# Patient Record
Sex: FEMALE | Race: OTHER | HISPANIC OR LATINO
[De-identification: names, ages, dates, MRNs, and addresses within clinical notes are randomized per-mention and may not be internally consistent; named-entity substitution may affect disease eponyms.]

---

## 2018-06-08 ENCOUNTER — APPOINTMENT (OUTPATIENT)
Dept: ORTHOPEDIC SURGERY | Facility: CLINIC | Age: 68
End: 2018-06-08

## 2018-07-09 ENCOUNTER — APPOINTMENT (OUTPATIENT)
Dept: ORTHOPEDIC SURGERY | Facility: CLINIC | Age: 68
End: 2018-07-09
Payer: COMMERCIAL

## 2018-07-09 VITALS
HEIGHT: 64 IN | WEIGHT: 278 LBS | DIASTOLIC BLOOD PRESSURE: 78 MMHG | BODY MASS INDEX: 47.46 KG/M2 | SYSTOLIC BLOOD PRESSURE: 120 MMHG

## 2018-07-09 PROCEDURE — 99204 OFFICE O/P NEW MOD 45 MIN: CPT | Mod: 25

## 2018-07-09 PROCEDURE — 20610 DRAIN/INJ JOINT/BURSA W/O US: CPT | Mod: LT

## 2018-07-09 PROCEDURE — 73564 X-RAY EXAM KNEE 4 OR MORE: CPT | Mod: RT

## 2018-07-09 RX ORDER — TRIAMCINOLONE ACETONIDE 1 MG/G
0.1 CREAM TOPICAL
Qty: 454 | Refills: 0 | Status: ACTIVE | COMMUNITY
Start: 2018-02-08

## 2018-07-09 RX ORDER — MELOXICAM 15 MG/1
15 TABLET ORAL DAILY
Qty: 30 | Refills: 2 | Status: ACTIVE | COMMUNITY
Start: 2018-07-09 | End: 1900-01-01

## 2018-07-09 RX ORDER — FLUOCINONIDE 0.5 MG/G
0.05 CREAM TOPICAL
Qty: 120 | Refills: 0 | Status: ACTIVE | COMMUNITY
Start: 2018-02-21

## 2018-07-09 RX ORDER — CELECOXIB 200 MG/1
200 CAPSULE ORAL
Qty: 30 | Refills: 0 | Status: ACTIVE | COMMUNITY
Start: 2018-05-23

## 2018-07-09 RX ORDER — PROMETHAZINE HYDROCHLORIDE 6.25 MG/5ML
6.25 SOLUTION ORAL
Qty: 200 | Refills: 0 | Status: ACTIVE | COMMUNITY
Start: 2018-04-11

## 2018-07-09 RX ORDER — AMOXICILLIN 875 MG/1
875 TABLET, FILM COATED ORAL
Qty: 14 | Refills: 0 | Status: ACTIVE | COMMUNITY
Start: 2018-04-11

## 2020-04-20 ENCOUNTER — APPOINTMENT (OUTPATIENT)
Dept: ORTHOPEDIC SURGERY | Facility: CLINIC | Age: 70
End: 2020-04-20

## 2020-06-15 ENCOUNTER — APPOINTMENT (OUTPATIENT)
Dept: ORTHOPEDIC SURGERY | Facility: CLINIC | Age: 70
End: 2020-06-15
Payer: COMMERCIAL

## 2020-06-15 VITALS — HEIGHT: 64 IN | BODY MASS INDEX: 47.12 KG/M2 | WEIGHT: 276 LBS

## 2020-06-15 PROCEDURE — 99214 OFFICE O/P EST MOD 30 MIN: CPT

## 2020-06-15 PROCEDURE — 73564 X-RAY EXAM KNEE 4 OR MORE: CPT | Mod: RT

## 2020-06-15 RX ORDER — CELECOXIB 200 MG/1
200 CAPSULE ORAL
Qty: 60 | Refills: 0 | Status: ACTIVE | COMMUNITY
Start: 2020-06-15 | End: 1900-01-01

## 2020-06-15 NOTE — PHYSICAL EXAM
[de-identified] : Patient is well nourished, well-developed, in no acute distress, with appropriate mood and affect. The patient is oriented to time, place, and person. Respirations are even and unlabored. Gait evaluation does reveal a limp. There is no inguinal adenopathy. Bilateral limbs are well-perfused, without skin lesions, shows a grossly normal motor and sensory examination. The right knee motion is significantly reduced and does cause significant pain. The right knee moves from 0 to 125 degrees. The knee is stable within that range-of-motion to AP and ML stress. The alignment of the knee is 5 degrees varus. Muscle strength is normal. Pedal pulses are palpable. Hip examination was negative. The left knee motion is significantly reduced and does cause significant pain. The left knee moves from 0 to 125 degrees. The knee is stable within that range-of-motion to AP and ML stress. The alignment of the knee is 5 degrees varus. Muscle strength is normal. Pedal pulses are palpable. Hip examination was negative. [de-identified] : Long standing knee, AP knee, lateral knee, and patellar views of the bilateral knee were ordered and taken in the office and demonstrate degenerative joint disease of the knee with joint space narrowing, osteophyte formation, and subchondral sclerosis.

## 2020-06-15 NOTE — HISTORY OF PRESENT ILLNESS
[de-identified] : Patient is a very nice 69 year old female who presents for reevaluation of left more than right knee pain, which is severe in intensity. patient was last seen in May 2018 diagnosed with bilateral OA and had injection to knees.  Patient than followed up with [facility], had several cortisone injections, uses tyelnol and OTC NSAID's with some relief. She has gone to PT with no relief.  Walking tolerance is reduced. Patient uses cane for ambulation The pain substantially limits of activities of daily living. patient states no radiation of pain, numbness to feet or weakness. Patient was scheduled for Left TKA next week at [facility] however surgeon cancelled stating she needs to lose weight, patient here for second opinion. patient does have right knee pain, but mild currently

## 2020-06-15 NOTE — DISCUSSION/SUMMARY
[de-identified] : This patient has bilateral knee osteoarthritis.  The patient is not an appropriate candidate for surgical intervention at this time because her BMI is 47 which is over the cutoff of 40 because of the significant increase in risk of complications of proceeding with surgery over this cut off.  This is similar to what [facility] has told her.  An extensive discussion was conducted on the natural history of the disease and the variety of surgical and non-surgical options available to the patient including, but not limited to non-steroidal anti-inflammatory medications, steroid injections, physical therapy, maintenance of ideal body weight, and reduction of activity.  Advised the patient to aggressively work on weight loss.  Offered a nutrition referral.  Celebrex was prescribed.  The patient will schedule an appointment as needed.\par

## 2020-06-16 RX ORDER — CELECOXIB 200 MG/1
200 CAPSULE ORAL
Qty: 60 | Refills: 0 | Status: ACTIVE | COMMUNITY
Start: 2020-06-16 | End: 1900-01-01

## 2020-09-14 ENCOUNTER — APPOINTMENT (OUTPATIENT)
Dept: ORTHOPEDIC SURGERY | Facility: CLINIC | Age: 70
End: 2020-09-14

## 2020-09-21 ENCOUNTER — APPOINTMENT (OUTPATIENT)
Dept: ORTHOPEDIC SURGERY | Facility: CLINIC | Age: 70
End: 2020-09-21
Payer: COMMERCIAL

## 2020-09-21 VITALS — BODY MASS INDEX: 45.41 KG/M2 | WEIGHT: 266 LBS | HEIGHT: 64 IN

## 2020-09-21 PROCEDURE — 99213 OFFICE O/P EST LOW 20 MIN: CPT

## 2020-09-21 RX ORDER — CLOTRIMAZOLE AND BETAMETHASONE DIPROPIONATE 10; .5 MG/G; MG/G
1-0.05 CREAM TOPICAL
Qty: 45 | Refills: 0 | Status: ACTIVE | COMMUNITY
Start: 2020-06-02

## 2020-09-21 RX ORDER — NAPROXEN 500 MG/1
500 TABLET ORAL
Qty: 14 | Refills: 0 | Status: ACTIVE | COMMUNITY
Start: 2020-08-26

## 2020-09-21 NOTE — DISCUSSION/SUMMARY
[de-identified] : This patient has bilateral knee osteoarthritis.  The patient is not an appropriate candidate for surgical intervention at this time because her BMI is 45.  She is to continue to work towards her BMI goal of 40 to be a candidate for surgery.  I told her that if she loses approximately 15-20 more pounds we will proceed with total knee arthroplasty.  Advised the patient to aggressively work on weight loss.  Offered a nutrition referral.  Continue with Celebrex.  The patient will schedule an appointment as needed.\par

## 2020-09-21 NOTE — PHYSICAL EXAM
[de-identified] : Patient is well nourished, well-developed, in no acute distress, with appropriate mood and affect. The patient is oriented to time, place, and person. Respirations are even and unlabored. Gait evaluation does reveal a limp. There is no inguinal adenopathy. Bilateral limbs are well-perfused, without skin lesions, shows a grossly normal motor and sensory examination. The right knee motion is significantly reduced and does cause significant pain. The right knee moves from 0 to 125 degrees. The knee is stable within that range-of-motion to AP and ML stress. The alignment of the knee is 5 degrees varus. Muscle strength is normal. Pedal pulses are palpable. Hip examination was negative. The left knee motion is significantly reduced and does cause significant pain. The left knee moves from 0 to 125 degrees. The knee is stable within that range-of-motion to AP and ML stress. The alignment of the knee is 5 degrees varus. Muscle strength is normal. Pedal pulses are palpable. Hip examination was negative.

## 2020-11-02 ENCOUNTER — APPOINTMENT (OUTPATIENT)
Dept: ORTHOPEDIC SURGERY | Facility: CLINIC | Age: 70
End: 2020-11-02
Payer: COMMERCIAL

## 2020-11-02 VITALS — WEIGHT: 254 LBS | HEIGHT: 64 IN | BODY MASS INDEX: 43.36 KG/M2

## 2020-11-02 PROCEDURE — 99072 ADDL SUPL MATRL&STAF TM PHE: CPT

## 2020-11-02 PROCEDURE — 99214 OFFICE O/P EST MOD 30 MIN: CPT

## 2020-11-02 PROCEDURE — 73564 X-RAY EXAM KNEE 4 OR MORE: CPT | Mod: RT

## 2020-11-02 NOTE — DISCUSSION/SUMMARY
[de-identified] : This patient has bilateral knee osteoarthritis.  She is failed a course of conservative management and would like to proceed with left total knee arthroplasty.  We will utilize robotic navigation for assistance given her elevated BMI.  After allowing a minimum of 3 months for healing we should monitor proceed with a staged right total knee arthroplasty.\par \par The patient is an appropriate candidate for consideration of left total knee replacement. An extensive discussion was conducted of the natural history of the disease and the variety of surgical and non-surgical treatment options available to the patient. A risk/benefit analysis was discussed with the patient reviewing the advantages and disadvantages of surgical intervention at this time. Both the level and length of the patient's pain have made additional conservative treatment measures consisting of NSAIDs, physical therapy, corticosteroids, and/or viscosupplementation contraindicated. A full explanation was given of the nature and the purpose of the procedure and anesthesia, its benefits, possible alternative methods of diagnosis or treatment, the risks involved, the possibility of complications, the foreseeable consequences of the procedure and the possible results of the non-treatment. I reviewed the plan of care as well as used a model of a total knee implant equivalent to the one that will be used for their total knee joint replacement. The ability to secure the implant utilizing cement or cementless (press-fit) was discussed with the patient. The patient agrees with the plan of care, as well as the use of implants for their total knee replacement.   We also discussed that if robotic/computer navigation is utilized, then additional incisions may need to be made to accommodate the computer navigation arrays, which will be placed in the femur and tibia.\par \par No guarantee or assurance was made as to the results that may be obtained. Specifically, the risks were identified to include, but are not limited to the following: Infection, phlebitis, pulmonary embolism, death, paralysis, dislocation, pain, stiffness, instability, limp, weakness, breakage, leg-length inequality, uncontrolled bleeding, nerve injury, blood vessel injury, pressure sores, anesthetic risks, delayed healing of wound and bone, and wear and loosening. Further discussion was undertaken with the patient about the details of surgical preparation, treatment, and postoperative rehabilitation including medical clearance, autotransfusion, the hospital course, and the postoperative rehabilitation involved. All in all, I feel that this patient is a good candidate for surgical reconstruction.\par \par The patient and I discussed the current SARS-CoV-2 (COVID-19) pandemic which has affected our local hospitals. We discussed that our hospitals treat patients with COVID-19. All efforts will be made to avoid cohorting the patient with diagnosed or suspected COVID-19 patient. They also understand that we will screen them 24-48 hours prior to surgery. Despite our best efforts, there is a potential risk for iatrogenic transmission of COVID-19 to the patient during the perioperative period. Jesusita COVID-19 during the perioperative period may increase the patient´s risks of an adverse outcome including postoperative pneumonia, difficulty breathing, requirement for a breathing tube (general endotracheal intubation), and death. The patient is understanding of this risk, and is willing to proceed with surgery at this time.\par \par The patient has been counseled regarding the elevated risks associated with surgical complications in patients with a BMI>35. The patient demonstrates a profound understanding of the increased risk. Nutrition and bariatric surgery options were discussed with the patient. After a lengthy discussion, the patient agreed to make a coordinated effort at weight loss prior to surgical intervention. The patient understands our BMI policy and they will attempt to bring their BMI below 40 and they are agreeable to this but she is wanting to proceed with surgery at this time recognizing the significantly increased risks.

## 2020-11-02 NOTE — PHYSICAL EXAM
[de-identified] : Patient is well nourished, well-developed, in no acute distress, with appropriate mood and affect. The patient is oriented to time, place, and person. Respirations are even and unlabored. Gait evaluation does reveal a limp. There is no inguinal adenopathy. Bilateral limbs are well-perfused, without skin lesions, shows a grossly normal motor and sensory examination. The right knee motion is significantly reduced and does cause significant pain. The right knee moves from 0 to 125 degrees. The knee is stable within that range-of-motion to AP and ML stress. The alignment of the knee is 5 degrees varus. Muscle strength is normal. Pedal pulses are palpable. Hip examination was negative. The left knee motion is significantly reduced and does cause significant pain. The left knee moves from 0 to 125 degrees. The knee is stable within that range-of-motion to AP and ML stress. The alignment of the knee is 5 degrees varus. Muscle strength is normal. Pedal pulses are palpable. Hip examination was negative. [de-identified] : Long standing knee, AP knee, lateral knee, and patellar views of the bilateral knee were ordered and taken in the office and demonstrate degenerative joint disease of the knee with joint space narrowing, osteophyte formation, and subchondral sclerosis.

## 2020-11-02 NOTE — HISTORY OF PRESENT ILLNESS
[de-identified] : Patient is a very nice 70 year old female who presents for reevaluation of left more than right knee pain, which is severe in intensity.  I have previously diagnosed with severe bilateral knee osteoarthritis.  She is been working on weight loss and has lost approximately 70 pounds recently. She is ready felt tried and failed a course of conservative management including some runs of cortisone injections, Tylenol, NSAIDs.  She also has had several rounds of cortisone injections.  She has tried 1 3 months of physical therapy without relief.  Walking tolerance is limited.  Activities daily living are limited.  Using a cane for ambulation.  The patient denies any radiation of the pain to the feet and it is not associated with numbness, tingling, or weakness.

## 2020-12-02 ENCOUNTER — OUTPATIENT (OUTPATIENT)
Dept: OUTPATIENT SERVICES | Facility: HOSPITAL | Age: 70
LOS: 1 days | Discharge: HOME | End: 2020-12-02
Payer: COMMERCIAL

## 2020-12-02 VITALS
SYSTOLIC BLOOD PRESSURE: 130 MMHG | HEART RATE: 82 BPM | HEIGHT: 66 IN | DIASTOLIC BLOOD PRESSURE: 72 MMHG | OXYGEN SATURATION: 97 % | WEIGHT: 245.82 LBS | TEMPERATURE: 98 F

## 2020-12-02 DIAGNOSIS — Z01.818 ENCOUNTER FOR OTHER PREPROCEDURAL EXAMINATION: ICD-10-CM

## 2020-12-02 DIAGNOSIS — M17.12 UNILATERAL PRIMARY OSTEOARTHRITIS, LEFT KNEE: ICD-10-CM

## 2020-12-02 DIAGNOSIS — Z90.49 ACQUIRED ABSENCE OF OTHER SPECIFIED PARTS OF DIGESTIVE TRACT: Chronic | ICD-10-CM

## 2020-12-02 LAB
A1C WITH ESTIMATED AVERAGE GLUCOSE RESULT: 5.1 % — SIGNIFICANT CHANGE UP (ref 4–5.6)
ANION GAP SERPL CALC-SCNC: 3 MMOL/L — LOW (ref 5–17)
APTT BLD: 26.6 SEC — LOW (ref 27.5–35.5)
BLD GP AB SCN SERPL QL: SIGNIFICANT CHANGE UP
BUN SERPL-MCNC: 8 MG/DL — SIGNIFICANT CHANGE UP (ref 7–23)
CALCIUM SERPL-MCNC: 10.4 MG/DL — HIGH (ref 8.5–10.1)
CHLORIDE SERPL-SCNC: 110 MMOL/L — HIGH (ref 96–108)
CO2 SERPL-SCNC: 30 MMOL/L — SIGNIFICANT CHANGE UP (ref 22–31)
CREAT SERPL-MCNC: 0.58 MG/DL — SIGNIFICANT CHANGE UP (ref 0.5–1.3)
ESTIMATED AVERAGE GLUCOSE: 100 MG/DL — SIGNIFICANT CHANGE UP (ref 68–114)
GLUCOSE SERPL-MCNC: 82 MG/DL — SIGNIFICANT CHANGE UP (ref 70–99)
HCT VFR BLD CALC: 41.4 % — SIGNIFICANT CHANGE UP (ref 34.5–45)
HGB BLD-MCNC: 13 G/DL — SIGNIFICANT CHANGE UP (ref 11.5–15.5)
INR BLD: 1 RATIO — SIGNIFICANT CHANGE UP (ref 0.88–1.16)
MCHC RBC-ENTMCNC: 28.2 PG — SIGNIFICANT CHANGE UP (ref 27–34)
MCHC RBC-ENTMCNC: 31.4 GM/DL — LOW (ref 32–36)
MCV RBC AUTO: 89.8 FL — SIGNIFICANT CHANGE UP (ref 80–100)
MRSA PCR RESULT.: SIGNIFICANT CHANGE UP
NRBC # BLD: 0 /100 WBCS — SIGNIFICANT CHANGE UP (ref 0–0)
PLATELET # BLD AUTO: 208 K/UL — SIGNIFICANT CHANGE UP (ref 150–400)
POTASSIUM SERPL-MCNC: 3.9 MMOL/L — SIGNIFICANT CHANGE UP (ref 3.5–5.3)
POTASSIUM SERPL-SCNC: 3.9 MMOL/L — SIGNIFICANT CHANGE UP (ref 3.5–5.3)
PROTHROM AB SERPL-ACNC: 11.6 SEC — SIGNIFICANT CHANGE UP (ref 10.6–13.6)
RBC # BLD: 4.61 M/UL — SIGNIFICANT CHANGE UP (ref 3.8–5.2)
RBC # FLD: 13.8 % — SIGNIFICANT CHANGE UP (ref 10.3–14.5)
S AUREUS DNA NOSE QL NAA+PROBE: SIGNIFICANT CHANGE UP
SODIUM SERPL-SCNC: 143 MMOL/L — SIGNIFICANT CHANGE UP (ref 135–145)
WBC # BLD: 3.21 K/UL — LOW (ref 3.8–10.5)
WBC # FLD AUTO: 3.21 K/UL — LOW (ref 3.8–10.5)

## 2020-12-02 PROCEDURE — 93010 ELECTROCARDIOGRAM REPORT: CPT

## 2020-12-02 NOTE — PHYSICAL THERAPY INITIAL EVALUATION ADULT - CRITERIA FOR SKILLED THERAPEUTIC INTERVENTIONS
anticipated discharge recommendation/functional limitations in following categories/risk reduction/prevention/impairments found/rehab potential/anticipated equipment needs at discharge/therapy frequency

## 2020-12-02 NOTE — PHYSICAL THERAPY INITIAL EVALUATION ADULT - IMPAIRMENTS FOUND, PT EVAL
joint integrity and mobility/aerobic capacity/endurance/ROM/gait, locomotion, and balance/muscle strength/posture

## 2020-12-02 NOTE — H&P PST ADULT - NSICDXPASTSURGICALHX_GEN_ALL_CORE_FT
Problem: Diabetes/Glucose Control  Goal: Glucose maintained within prescribed range  Description: INTERVENTIONS:  - Monitor Blood Glucose as ordered  - Assess for signs and symptoms of hyperglycemia and hypoglycemia  - Administer ordered medications to m Initiate emergency measures for life threatening arrhythmias  - Monitor electrolytes and administer replacement therapy as ordered  Outcome: Progressing   Patient with chronic pain. PRN percocet given. S/p cardiac cath 11/2, R groin site CDI.  Plan to disch PAST SURGICAL HISTORY:  S/P cholecystectomy

## 2020-12-02 NOTE — OCCUPATIONAL THERAPY INITIAL EVALUATION ADULT - ANTICIPATED DISCHARGE DISPOSITION, OT EVAL
Home with home OT for home safety evaluation and to improve functional ADLs and transfers/mobility. home w/ outpatient/Pending post op OT evaluation.

## 2020-12-02 NOTE — PHYSICAL THERAPY INITIAL EVALUATION ADULT - TINETTI BALANCE TEST, REHAB EVAL
Sitting Balance - 1/1 , Rises From Chair - 2/2, Attempts to rise - 2/2 , Immediate Standing Balance - 2/2,  Standing Balance - 2/2, Nudged -  0/2, Eyes Closed - 0/1,  Turning 360 Deg -  1/2, Sitting down - 2/2, Balance Score - 12/16

## 2020-12-02 NOTE — PHYSICAL THERAPY INITIAL EVALUATION ADULT - PERTINENT HX OF CURRENT PROBLEM, REHAB EVAL
Pt with chronic pain and instability with limited functional mobility in L Pt with chronic pain and instability with limited functional mobility in L knee. Pt is scheduled for elective total joint replacement on 12/18/20  by  Dr. James.

## 2020-12-02 NOTE — OCCUPATIONAL THERAPY INITIAL EVALUATION ADULT - ADDITIONAL COMMENTS
Patient lives with mother (Daughter will be able to assist post op) in an apartment with 6 steps to enter with a R rail. Once inside, the patient main bedroom and bathroom is on that floor when entering. The patients bathroom has a tub/shower combination, fixed shower head, comfort height toilet seat + grab bars. The patient reports that a 3/1 commode can fit over the toilet at home. The patient ambulates with no device inside of the home and uses a cane outside of the home. The patient does not own any other device for ambulation. The patient daily pain is a 8/10 at rest and a 8/10 with movement. The patient manages the pain with Tylenol and rest. The patient has no recent outpatient PT, no recent falls and no buckling. The patient wears glasses for reading, R handed, drives and has no hearing impairments.

## 2020-12-02 NOTE — PHYSICAL THERAPY INITIAL EVALUATION ADULT - TINETTI GAIT TEST, REHAB EVAL
Indication of gait - 1/1,   Step Length and height - 1/2,   Foot Clearance - 2/2,   Step Symmetry - 0/1,  Step Continuity - 1/1,   Path - 2/ 2,   Trunk - 1/2,   Walking Time - 0/1,   Total Score - 8/12

## 2020-12-02 NOTE — PHYSICAL THERAPY INITIAL EVALUATION ADULT - GENERAL OBSERVATIONS, REHAB EVAL
Patient was seen seated c straight cane in [facility] waiting area c no apparent distress. Height:  5'4"  Weight: 249 lbs

## 2020-12-02 NOTE — H&P PST ADULT - ASSESSMENT
Preoperative examination  Assessment and Plan: labs - CBC,BMP, PT/INR, aPTT, T & S, HgA1C, EKG, PT/ OT consult  Preop 3 day Hibiclens instruction reviewed and given  Instructed that if nose cx is positive, use mupuricin for 5 days and checklist given.   Patient instructed of NPO status and to take routine meds DOS with sips of water. Avoid NSAIDs, aspirin, and herbal products. Patient verbalized understanding.  Information on proper nutrition, increase protein and better food choices provided in packet.  Patient to go for COVID swabbing at [facility] 3 days prior to surgery.   CAPRINI SCORE [CLOT]    AGE RELATED RISK FACTORS                                                       MOBILITY RELATED FACTORS  [ ] Age 41-60 years                                            (1 Point)                  [ ] Bed rest                                                        (1 Point)  [ x] Age: 61-74 years                                           (2 Points)                 [ ] Plaster cast                                                   (2 Points)  [ ] Age= 75 years                                              (3 Points)                 [ ] Bed bound for more than 72 hours                 (2 Points)    DISEASE RELATED RISK FACTORS                                               GENDER SPECIFIC FACTORS  [ ] Edema in the lower extremities                       (1 Point)                  [ ] Pregnancy                                                     (1 Point)  [ ] Varicose veins                                               (1 Point)                  [ ] Post-partum < 6 weeks                                   (1 Point)             [ ] BMI > 25 Kg/m2                                            (1 Point)                  [ ] Hormonal therapy  or oral contraception          (1 Point)                 [ ] Sepsis (in the previous month)                        (1 Point)                  [ ] History of pregnancy complications                 (1 point)  [ ] Pneumonia or serious lung disease                                               [ ] Unexplained or recurrent                     (1 Point)           (in the previous month)                               (1 Point)  [ ] Abnormal pulmonary function test                     (1 Point)                 SURGERY RELATED RISK FACTORS  [ ] Acute myocardial infarction                              (1 Point)                 [ ]  Section                                             (1 Point)  [ ] Congestive heart failure (in the previous month)  (1 Point)               [ ] Minor surgery                                                  (1 Point)   [ ] Inflammatory bowel disease                             (1 Point)                 [ ] Arthroscopic surgery                                        (2 Points)  [ ] Central venous access                                      (2 Points)                [ ] General surgery lasting more than 45 minutes   (2 Points)       [ ] Stroke (in the previous month)                          (5 Points)               [ x] Elective arthroplasty                                         (5 Points)                                                                                                                                               HEMATOLOGY RELATED FACTORS                                                 TRAUMA RELATED RISK FACTORS  [ ] Prior episodes of VTE                                     (3 Points)                [ ] Fracture of the hip, pelvis, or leg                       (5 Points)  [ ] Positive family history for VTE                         (3 Points)                 [ ] Acute spinal cord injury (in the previous month)  (5 Points)  [ ] Prothrombin 66071 A                                     (3 Points)                 [ ] Paralysis  (less than 1 month)                             (5 Points)  [ ] Factor V Leiden                                             (3 Points)                  [ ] Multiple Trauma within 1 month                        (5 Points)  [ ] Lupus anticoagulants                                     (3 Points)                                                           [ ] Anticardiolipin antibodies                               (3 Points)                                                       [ ] High homocysteine in the blood                      (3 Points)                                             [ ] Other congenital or acquired thrombophilia      (3 Points)                                                [ ] Heparin induced thrombocytopenia                  (3 Points)                                          Total Score [   7   ]    Caprini Score 0 - 2:  Low Risk, No VTE Prophylaxis required for most patients, encourage ambulation  Caprini Score 3 - 6:  At Risk, pharmacologic VTE prophylaxis is indicated for most patients (in the absence of a contraindication)  Caprini Score Greater than or = 7:  High Risk, pharmacologic VTE prophylaxis is indicated for most patients (in the absence of a contraindication)    Preoperative examination  Assessment and Plan: labs - CBC,BMP, PT/INR, aPTT, T & S, HgA1C, EKG, PT/ OT consult  Preop 3 day Hibiclens instruction reviewed and given  Instructed that if nose cx is positive, use mupuricin for 5 days and checklist given.   Patient instructed of NPO status and to take routine meds DOS with sips of water. Avoid NSAIDs, aspirin, and herbal products. Patient verbalized understanding.  Information on proper nutrition, increase protein and better food choices provided in packet.  Patient to go for COVID swabbing at [facility] 3 days prior to surgery.   CAPRINI SCORE [CLOT]    AGE RELATED RISK FACTORS                                                       MOBILITY RELATED FACTORS  [ ] Age 41-60 years                                            (1 Point)                  [ ] Bed rest                                                        (1 Point)  [ x] Age: 61-74 years                                           (2 Points)                 [ ] Plaster cast                                                   (2 Points)  [ ] Age= 75 years                                              (3 Points)                 [ ] Bed bound for more than 72 hours                 (2 Points)    DISEASE RELATED RISK FACTORS                                               GENDER SPECIFIC FACTORS  [ ] Edema in the lower extremities                       (1 Point)                  [ ] Pregnancy                                                     (1 Point)  [ ] Varicose veins                                               (1 Point)                  [ ] Post-partum < 6 weeks                                   (1 Point)             [ ] BMI > 25 Kg/m2                                            (1 Point)                  [ ] Hormonal therapy  or oral contraception          (1 Point)                 [ ] Sepsis (in the previous month)                        (1 Point)                  [ ] History of pregnancy complications                 (1 point)  [ ] Pneumonia or serious lung disease                                               [ ] Unexplained or recurrent                     (1 Point)           (in the previous month)                               (1 Point)  [ ] Abnormal pulmonary function test                     (1 Point)                 SURGERY RELATED RISK FACTORS  [ ] Acute myocardial infarction                              (1 Point)                 [ ]  Section                                             (1 Point)  [ ] Congestive heart failure (in the previous month)  (1 Point)               [ ] Minor surgery                                                  (1 Point)   [ ] Inflammatory bowel disease                             (1 Point)                 [ ] Arthroscopic surgery                                        (2 Points)  [ ] Central venous access                                      (2 Points)                [ ] General surgery lasting more than 45 minutes   (2 Points)       [ ] Stroke (in the previous month)                          (5 Points)               [ x] Elective arthroplasty                                         (5 Points)                                                                                                                                               HEMATOLOGY RELATED FACTORS                                                 TRAUMA RELATED RISK FACTORS  [ ] Prior episodes of VTE                                     (3 Points)                [ ] Fracture of the hip, pelvis, or leg                       (5 Points)  [ ] Positive family history for VTE                         (3 Points)                 [ ] Acute spinal cord injury (in the previous month)  (5 Points)  [ ] Prothrombin 50949 A                                     (3 Points)                 [ ] Paralysis  (less than 1 month)                             (5 Points)  [ ] Factor V Leiden                                             (3 Points)                  [ ] Multiple Trauma within 1 month                        (5 Points)  [ ] Lupus anticoagulants                                     (3 Points)                                                           [ ] Anticardiolipin antibodies                               (3 Points)                                                       [ ] High homocysteine in the blood                      (3 Points)                                             [ ] Other congenital or acquired thrombophilia      (3 Points)                                                [ ] Heparin induced thrombocytopenia                  (3 Points)                                          Total Score [   7   ]    Caprini Score 0 - 2:  Low Risk, No VTE Prophylaxis required for most patients, encourage ambulation  Caprini Score 3 - 6:  At Risk, pharmacologic VTE prophylaxis is indicated for most patients (in the absence of a contraindication)  Caprini Score Greater than or = 7:  High Risk, pharmacologic VTE prophylaxis is indicated for most patients (in the absence of a contraindication)    Preoperative examination  Assessment and Plan: labs - CBC,BMP, PT/INR, aPTT, T & S, HgA1C, EKG, PT/ OT consult  Preop Hibiclens instruction reviewed and given  Instructed that if nose cx is positive, use mupirocin for 5 days and checklist given.   Patient instructed of NPO status and to take routine meds DOS with sips of water. Avoid NSAIDs, aspirin, and herbal products. Patient verbalized understanding.  Information on proper nutrition, increase protein and better food choices provided in packet.  Patient to go for COVID swabbing at [facility] 3 days prior to surgery.  Patient to obtain medical clearance from Dr. Cornejo prior to surgery, medical clearance form provided today.

## 2020-12-02 NOTE — H&P PST ADULT - HISTORY OF PRESENT ILLNESS
Patient is a 70 year old male with history of . Patient presents today for presurgical testing for a scheduled on 12/18/20 .Patient is feeling well today with no acute complaints. He denies any chest pain, shortness of breath, dizziness, syncope, cough, fever, chills or recent travel. Patient is a 70 year old female with no significant PMH. Patient presents today for presurgical testing for a scheduled on 12/18/20. Patient is feeling well today with no acute complaints. He denies any chest pain, shortness of breath, dizziness, syncope, cough, fever, chills or recent travel. Patient is a 70 year old female with no significant PMH. Patient presents today for presurgical testing for a scheduled on 12/18/20. Patient is feeling well today with no acute complaints. She denies any chest pain, shortness of breath, dizziness, syncope, cough, fever, chills or recent travel.

## 2020-12-02 NOTE — PHYSICAL THERAPY INITIAL EVALUATION ADULT - ADDITIONAL COMMENTS
Pt lives in an apartment with her mother and her daughter will be visiting to assist with post-op care. To enter there are 6 steps with R rail up, inside there are no steps to negotiate. Bathroom has a tub-shower combo, 1 grab bar present, fixed shower head, comfort height toilet, can fit 3:1 commode. Pt does not own any DME other than straight cane. Pt only uses cane outside within the community. Average pain level at rest is 8/10. Pain remains at 8/10 c sit to stand transfer, prolonged standing, ambulation, and stairs negotiation. Pt uses Tylenol as needed and has no previous adverse reactions to pain meds. Currently not receiving outpatient PT, denies h/o falls, denies buckling. Pt wears glasses for reading, is R hand dominant, currently driving, denies use of hearing aids, and is employed at [employer] (occupation unknown).

## 2020-12-07 ENCOUNTER — NON-APPOINTMENT (OUTPATIENT)
Age: 70
End: 2020-12-07

## 2020-12-17 ENCOUNTER — TRANSCRIPTION ENCOUNTER (OUTPATIENT)
Age: 70
End: 2020-12-17

## 2020-12-18 ENCOUNTER — APPOINTMENT (OUTPATIENT)
Dept: ORTHOPEDIC SURGERY | Facility: HOSPITAL | Age: 70
End: 2020-12-18

## 2020-12-18 ENCOUNTER — RESULT REVIEW (OUTPATIENT)
Age: 70
End: 2020-12-18

## 2020-12-18 ENCOUNTER — INPATIENT (INPATIENT)
Facility: HOSPITAL | Age: 70
LOS: 0 days | Discharge: HOME HEALTH | End: 2020-12-19
Attending: ORTHOPAEDIC SURGERY | Admitting: ORTHOPAEDIC SURGERY
Payer: COMMERCIAL

## 2020-12-18 ENCOUNTER — TRANSCRIPTION ENCOUNTER (OUTPATIENT)
Age: 70
End: 2020-12-18

## 2020-12-18 VITALS
SYSTOLIC BLOOD PRESSURE: 145 MMHG | RESPIRATION RATE: 18 BRPM | HEART RATE: 83 BPM | OXYGEN SATURATION: 100 % | WEIGHT: 248.9 LBS | TEMPERATURE: 98 F | HEIGHT: 64 IN | DIASTOLIC BLOOD PRESSURE: 56 MMHG

## 2020-12-18 DIAGNOSIS — Z90.49 ACQUIRED ABSENCE OF OTHER SPECIFIED PARTS OF DIGESTIVE TRACT: Chronic | ICD-10-CM

## 2020-12-18 PROCEDURE — 20985 CPTR-ASST DIR MS PX: CPT

## 2020-12-18 PROCEDURE — 88311 DECALCIFY TISSUE: CPT | Mod: 26

## 2020-12-18 PROCEDURE — 73560 X-RAY EXAM OF KNEE 1 OR 2: CPT | Mod: 26,LT

## 2020-12-18 PROCEDURE — 27447 TOTAL KNEE ARTHROPLASTY: CPT | Mod: LT

## 2020-12-18 PROCEDURE — 88305 TISSUE EXAM BY PATHOLOGIST: CPT | Mod: 26

## 2020-12-18 RX ORDER — TRAMADOL HYDROCHLORIDE 50 MG/1
50 TABLET ORAL EVERY 6 HOURS
Refills: 0 | Status: DISCONTINUED | OUTPATIENT
Start: 2020-12-18 | End: 2020-12-19

## 2020-12-18 RX ORDER — SENNA PLUS 8.6 MG/1
2 TABLET ORAL AT BEDTIME
Refills: 0 | Status: DISCONTINUED | OUTPATIENT
Start: 2020-12-18 | End: 2020-12-19

## 2020-12-18 RX ORDER — ACETAMINOPHEN 500 MG
1000 TABLET ORAL ONCE
Refills: 0 | Status: COMPLETED | OUTPATIENT
Start: 2020-12-18 | End: 2020-12-18

## 2020-12-18 RX ORDER — HYDROMORPHONE HYDROCHLORIDE 2 MG/ML
0.5 INJECTION INTRAMUSCULAR; INTRAVENOUS; SUBCUTANEOUS
Refills: 0 | Status: DISCONTINUED | OUTPATIENT
Start: 2020-12-18 | End: 2020-12-18

## 2020-12-18 RX ORDER — POLYETHYLENE GLYCOL 3350 17 G/17G
17 POWDER, FOR SOLUTION ORAL AT BEDTIME
Refills: 0 | Status: DISCONTINUED | OUTPATIENT
Start: 2020-12-18 | End: 2020-12-19

## 2020-12-18 RX ORDER — HYDROMORPHONE HYDROCHLORIDE 2 MG/ML
1 INJECTION INTRAMUSCULAR; INTRAVENOUS; SUBCUTANEOUS
Refills: 0 | Status: DISCONTINUED | OUTPATIENT
Start: 2020-12-18 | End: 2020-12-18

## 2020-12-18 RX ORDER — ACETAMINOPHEN 500 MG
975 TABLET ORAL EVERY 8 HOURS
Refills: 0 | Status: DISCONTINUED | OUTPATIENT
Start: 2020-12-18 | End: 2020-12-19

## 2020-12-18 RX ORDER — SODIUM CHLORIDE 9 MG/ML
500 INJECTION, SOLUTION INTRAVENOUS ONCE
Refills: 0 | Status: COMPLETED | OUTPATIENT
Start: 2020-12-19 | End: 2020-12-19

## 2020-12-18 RX ORDER — LANOLIN ALCOHOL/MO/W.PET/CERES
3 CREAM (GRAM) TOPICAL AT BEDTIME
Refills: 0 | Status: DISCONTINUED | OUTPATIENT
Start: 2020-12-18 | End: 2020-12-19

## 2020-12-18 RX ORDER — HYDROMORPHONE HYDROCHLORIDE 2 MG/ML
0.5 INJECTION INTRAMUSCULAR; INTRAVENOUS; SUBCUTANEOUS ONCE
Refills: 0 | Status: DISCONTINUED | OUTPATIENT
Start: 2020-12-18 | End: 2020-12-19

## 2020-12-18 RX ORDER — HYDROMORPHONE HYDROCHLORIDE 2 MG/ML
0.5 INJECTION INTRAMUSCULAR; INTRAVENOUS; SUBCUTANEOUS ONCE
Refills: 0 | Status: COMPLETED | OUTPATIENT
Start: 2020-12-18

## 2020-12-18 RX ORDER — ACETAMINOPHEN 500 MG
650 TABLET ORAL ONCE
Refills: 0 | Status: COMPLETED | OUTPATIENT
Start: 2020-12-18 | End: 2020-12-18

## 2020-12-18 RX ORDER — ASPIRIN/CALCIUM CARB/MAGNESIUM 324 MG
81 TABLET ORAL
Refills: 0 | Status: DISCONTINUED | OUTPATIENT
Start: 2020-12-18 | End: 2020-12-19

## 2020-12-18 RX ORDER — PANTOPRAZOLE SODIUM 20 MG/1
40 TABLET, DELAYED RELEASE ORAL
Refills: 0 | Status: DISCONTINUED | OUTPATIENT
Start: 2020-12-18 | End: 2020-12-19

## 2020-12-18 RX ORDER — KETOROLAC TROMETHAMINE 30 MG/ML
15 SYRINGE (ML) INJECTION EVERY 6 HOURS
Refills: 0 | Status: DISCONTINUED | OUTPATIENT
Start: 2020-12-18 | End: 2020-12-19

## 2020-12-18 RX ORDER — DIPHENHYDRAMINE HCL 50 MG
25 CAPSULE ORAL EVERY 6 HOURS
Refills: 0 | Status: DISCONTINUED | OUTPATIENT
Start: 2020-12-18 | End: 2020-12-19

## 2020-12-18 RX ORDER — CEFAZOLIN SODIUM 1 G
2000 VIAL (EA) INJECTION EVERY 8 HOURS
Refills: 0 | Status: COMPLETED | OUTPATIENT
Start: 2020-12-18 | End: 2020-12-19

## 2020-12-18 RX ORDER — ONDANSETRON 8 MG/1
4 TABLET, FILM COATED ORAL EVERY 6 HOURS
Refills: 0 | Status: DISCONTINUED | OUTPATIENT
Start: 2020-12-18 | End: 2020-12-19

## 2020-12-18 RX ORDER — CELECOXIB 200 MG/1
200 CAPSULE ORAL EVERY 12 HOURS
Refills: 0 | Status: DISCONTINUED | OUTPATIENT
Start: 2020-12-19 | End: 2020-12-19

## 2020-12-18 RX ORDER — SODIUM CHLORIDE 9 MG/ML
1000 INJECTION, SOLUTION INTRAVENOUS
Refills: 0 | Status: DISCONTINUED | OUTPATIENT
Start: 2020-12-18 | End: 2020-12-18

## 2020-12-18 RX ORDER — OXYCODONE HYDROCHLORIDE 5 MG/1
5 TABLET ORAL
Refills: 0 | Status: DISCONTINUED | OUTPATIENT
Start: 2020-12-18 | End: 2020-12-19

## 2020-12-18 RX ORDER — ACETAMINOPHEN 500 MG
1000 TABLET ORAL ONCE
Refills: 0 | Status: DISCONTINUED | OUTPATIENT
Start: 2020-12-18 | End: 2020-12-19

## 2020-12-18 RX ORDER — BENZOCAINE AND MENTHOL 5; 1 G/100ML; G/100ML
1 LIQUID ORAL
Refills: 0 | Status: DISCONTINUED | OUTPATIENT
Start: 2020-12-18 | End: 2020-12-19

## 2020-12-18 RX ORDER — SODIUM CHLORIDE 9 MG/ML
500 INJECTION, SOLUTION INTRAVENOUS ONCE
Refills: 0 | Status: COMPLETED | OUTPATIENT
Start: 2020-12-18 | End: 2020-12-18

## 2020-12-18 RX ORDER — METOCLOPRAMIDE HCL 10 MG
10 TABLET ORAL ONCE
Refills: 0 | Status: DISCONTINUED | OUTPATIENT
Start: 2020-12-18 | End: 2020-12-18

## 2020-12-18 RX ORDER — SODIUM CHLORIDE 9 MG/ML
3 INJECTION INTRAMUSCULAR; INTRAVENOUS; SUBCUTANEOUS EVERY 8 HOURS
Refills: 0 | Status: DISCONTINUED | OUTPATIENT
Start: 2020-12-18 | End: 2020-12-18

## 2020-12-18 RX ORDER — MAGNESIUM HYDROXIDE 400 MG/1
30 TABLET, CHEWABLE ORAL DAILY
Refills: 0 | Status: DISCONTINUED | OUTPATIENT
Start: 2020-12-18 | End: 2020-12-19

## 2020-12-18 RX ORDER — DEXAMETHASONE 0.5 MG/5ML
8 ELIXIR ORAL ONCE
Refills: 0 | Status: COMPLETED | OUTPATIENT
Start: 2020-12-19 | End: 2020-12-19

## 2020-12-18 RX ORDER — CELECOXIB 200 MG/1
200 CAPSULE ORAL ONCE
Refills: 0 | Status: COMPLETED | OUTPATIENT
Start: 2020-12-18 | End: 2020-12-18

## 2020-12-18 RX ORDER — FOLIC ACID 0.8 MG
1 TABLET ORAL DAILY
Refills: 0 | Status: DISCONTINUED | OUTPATIENT
Start: 2020-12-18 | End: 2020-12-19

## 2020-12-18 RX ORDER — ASCORBIC ACID 60 MG
500 TABLET,CHEWABLE ORAL
Refills: 0 | Status: DISCONTINUED | OUTPATIENT
Start: 2020-12-18 | End: 2020-12-19

## 2020-12-18 RX ORDER — OXYCODONE HYDROCHLORIDE 5 MG/1
10 TABLET ORAL
Refills: 0 | Status: DISCONTINUED | OUTPATIENT
Start: 2020-12-18 | End: 2020-12-19

## 2020-12-18 RX ADMIN — Medication 1 MILLIGRAM(S): at 21:58

## 2020-12-18 RX ADMIN — SODIUM CHLORIDE 500 MILLILITER(S): 9 INJECTION, SOLUTION INTRAVENOUS at 13:10

## 2020-12-18 RX ADMIN — Medication 1 TABLET(S): at 21:58

## 2020-12-18 RX ADMIN — HYDROMORPHONE HYDROCHLORIDE 0.5 MILLIGRAM(S): 2 INJECTION INTRAMUSCULAR; INTRAVENOUS; SUBCUTANEOUS at 13:57

## 2020-12-18 RX ADMIN — Medication 500 MILLIGRAM(S): at 17:27

## 2020-12-18 RX ADMIN — POLYETHYLENE GLYCOL 3350 17 GRAM(S): 17 POWDER, FOR SOLUTION ORAL at 21:57

## 2020-12-18 RX ADMIN — Medication 975 MILLIGRAM(S): at 23:31

## 2020-12-18 RX ADMIN — SODIUM CHLORIDE 500 MILLILITER(S): 9 INJECTION, SOLUTION INTRAVENOUS at 16:07

## 2020-12-18 RX ADMIN — SENNA PLUS 2 TABLET(S): 8.6 TABLET ORAL at 21:57

## 2020-12-18 RX ADMIN — HYDROMORPHONE HYDROCHLORIDE 0.5 MILLIGRAM(S): 2 INJECTION INTRAMUSCULAR; INTRAVENOUS; SUBCUTANEOUS at 14:17

## 2020-12-18 RX ADMIN — Medication 1000 MILLIGRAM(S): at 16:15

## 2020-12-18 RX ADMIN — Medication 975 MILLIGRAM(S): at 21:58

## 2020-12-18 RX ADMIN — OXYCODONE HYDROCHLORIDE 10 MILLIGRAM(S): 5 TABLET ORAL at 23:32

## 2020-12-18 RX ADMIN — Medication 15 MILLIGRAM(S): at 17:45

## 2020-12-18 RX ADMIN — Medication 650 MILLIGRAM(S): at 10:02

## 2020-12-18 RX ADMIN — CELECOXIB 200 MILLIGRAM(S): 200 CAPSULE ORAL at 10:02

## 2020-12-18 RX ADMIN — Medication 81 MILLIGRAM(S): at 17:27

## 2020-12-18 RX ADMIN — OXYCODONE HYDROCHLORIDE 10 MILLIGRAM(S): 5 TABLET ORAL at 21:58

## 2020-12-18 RX ADMIN — Medication 100 MILLIGRAM(S): at 19:34

## 2020-12-18 RX ADMIN — Medication 15 MILLIGRAM(S): at 17:27

## 2020-12-18 RX ADMIN — Medication 400 MILLIGRAM(S): at 16:08

## 2020-12-18 NOTE — PHYSICAL THERAPY INITIAL EVALUATION ADULT - CRITERIA FOR SKILLED THERAPEUTIC INTERVENTIONS
Home with home PT< PT received DME/impairments found/risk reduction/prevention/rehab potential/therapy frequency/predicted duration of therapy intervention/anticipated equipment needs at discharge/anticipated discharge recommendation

## 2020-12-18 NOTE — OCCUPATIONAL THERAPY INITIAL EVALUATION ADULT - ADDITIONAL COMMENTS
Pre op assessment confirmed - Patient lives with mother (Daughter will be able to assist post op) in an apartment with 6 steps to enter with a R rail. Once inside, the patient main bedroom and bathroom is on that floor when entering. The patients bathroom has a tub/shower combination, fixed shower head, comfort height toilet seat + grab bars. The patient reports that a 3/1 commode can fit over the toilet at home.

## 2020-12-19 ENCOUNTER — TRANSCRIPTION ENCOUNTER (OUTPATIENT)
Age: 70
End: 2020-12-19

## 2020-12-19 VITALS
OXYGEN SATURATION: 97 % | TEMPERATURE: 98 F | HEART RATE: 75 BPM | RESPIRATION RATE: 17 BRPM | SYSTOLIC BLOOD PRESSURE: 132 MMHG | DIASTOLIC BLOOD PRESSURE: 68 MMHG

## 2020-12-19 LAB
ANION GAP SERPL CALC-SCNC: 5 MMOL/L — SIGNIFICANT CHANGE UP (ref 5–17)
BUN SERPL-MCNC: 11 MG/DL — SIGNIFICANT CHANGE UP (ref 7–23)
CALCIUM SERPL-MCNC: 9.5 MG/DL — SIGNIFICANT CHANGE UP (ref 8.5–10.1)
CHLORIDE SERPL-SCNC: 109 MMOL/L — HIGH (ref 96–108)
CO2 SERPL-SCNC: 28 MMOL/L — SIGNIFICANT CHANGE UP (ref 22–31)
CREAT SERPL-MCNC: 0.62 MG/DL — SIGNIFICANT CHANGE UP (ref 0.5–1.3)
GLUCOSE SERPL-MCNC: 110 MG/DL — HIGH (ref 70–99)
HCT VFR BLD CALC: 32.8 % — LOW (ref 34.5–45)
HGB BLD-MCNC: 10.8 G/DL — LOW (ref 11.5–15.5)
MCHC RBC-ENTMCNC: 29 PG — SIGNIFICANT CHANGE UP (ref 27–34)
MCHC RBC-ENTMCNC: 32.9 GM/DL — SIGNIFICANT CHANGE UP (ref 32–36)
MCV RBC AUTO: 88.2 FL — SIGNIFICANT CHANGE UP (ref 80–100)
NRBC # BLD: 0 /100 WBCS — SIGNIFICANT CHANGE UP (ref 0–0)
PLATELET # BLD AUTO: 187 K/UL — SIGNIFICANT CHANGE UP (ref 150–400)
POTASSIUM SERPL-MCNC: 3.7 MMOL/L — SIGNIFICANT CHANGE UP (ref 3.5–5.3)
POTASSIUM SERPL-SCNC: 3.7 MMOL/L — SIGNIFICANT CHANGE UP (ref 3.5–5.3)
RBC # BLD: 3.72 M/UL — LOW (ref 3.8–5.2)
RBC # FLD: 13.6 % — SIGNIFICANT CHANGE UP (ref 10.3–14.5)
SODIUM SERPL-SCNC: 142 MMOL/L — SIGNIFICANT CHANGE UP (ref 135–145)
WBC # BLD: 7.58 K/UL — SIGNIFICANT CHANGE UP (ref 3.8–10.5)
WBC # FLD AUTO: 7.58 K/UL — SIGNIFICANT CHANGE UP (ref 3.8–10.5)

## 2020-12-19 RX ORDER — OMEPRAZOLE 10 MG/1
1 CAPSULE, DELAYED RELEASE ORAL
Qty: 30 | Refills: 0
Start: 2020-12-19 | End: 2021-01-17

## 2020-12-19 RX ORDER — ASPIRIN/CALCIUM CARB/MAGNESIUM 324 MG
1 TABLET ORAL
Qty: 60 | Refills: 0
Start: 2020-12-19 | End: 2021-01-17

## 2020-12-19 RX ORDER — ACETAMINOPHEN 500 MG
3 TABLET ORAL
Qty: 270 | Refills: 0
Start: 2020-12-19 | End: 2021-01-17

## 2020-12-19 RX ORDER — TRAMADOL HYDROCHLORIDE 50 MG/1
1 TABLET ORAL
Qty: 20 | Refills: 0
Start: 2020-12-19 | End: 2020-12-24

## 2020-12-19 RX ORDER — OXYCODONE HYDROCHLORIDE 5 MG/1
1 TABLET ORAL
Qty: 20 | Refills: 0
Start: 2020-12-19 | End: 2020-12-23

## 2020-12-19 RX ORDER — OXYCODONE HYDROCHLORIDE 5 MG/1
1 TABLET ORAL
Qty: 20 | Refills: 0
Start: 2020-12-19 | End: 2020-12-24

## 2020-12-19 RX ORDER — CELECOXIB 200 MG/1
1 CAPSULE ORAL
Qty: 60 | Refills: 0
Start: 2020-12-19 | End: 2021-01-17

## 2020-12-19 RX ORDER — TRAMADOL HYDROCHLORIDE 50 MG/1
1 TABLET ORAL
Qty: 20 | Refills: 0
Start: 2020-12-19 | End: 2020-12-23

## 2020-12-19 RX ADMIN — CELECOXIB 200 MILLIGRAM(S): 200 CAPSULE ORAL at 06:08

## 2020-12-19 RX ADMIN — SODIUM CHLORIDE 500 MILLILITER(S): 9 INJECTION, SOLUTION INTRAVENOUS at 05:33

## 2020-12-19 RX ADMIN — Medication 15 MILLIGRAM(S): at 05:33

## 2020-12-19 RX ADMIN — Medication 500 MILLIGRAM(S): at 05:33

## 2020-12-19 RX ADMIN — Medication 15 MILLIGRAM(S): at 12:03

## 2020-12-19 RX ADMIN — PANTOPRAZOLE SODIUM 40 MILLIGRAM(S): 20 TABLET, DELAYED RELEASE ORAL at 08:08

## 2020-12-19 RX ADMIN — Medication 1 TABLET(S): at 12:03

## 2020-12-19 RX ADMIN — Medication 975 MILLIGRAM(S): at 06:08

## 2020-12-19 RX ADMIN — Medication 975 MILLIGRAM(S): at 14:39

## 2020-12-19 RX ADMIN — Medication 975 MILLIGRAM(S): at 05:33

## 2020-12-19 RX ADMIN — Medication 975 MILLIGRAM(S): at 15:30

## 2020-12-19 RX ADMIN — Medication 100 MILLIGRAM(S): at 03:38

## 2020-12-19 RX ADMIN — Medication 15 MILLIGRAM(S): at 06:08

## 2020-12-19 RX ADMIN — OXYCODONE HYDROCHLORIDE 10 MILLIGRAM(S): 5 TABLET ORAL at 14:39

## 2020-12-19 RX ADMIN — Medication 15 MILLIGRAM(S): at 13:00

## 2020-12-19 RX ADMIN — Medication 75 MILLIGRAM(S): at 05:33

## 2020-12-19 RX ADMIN — Medication 101.6 MILLIGRAM(S): at 05:33

## 2020-12-19 RX ADMIN — HYDROMORPHONE HYDROCHLORIDE 0.5 MILLIGRAM(S): 2 INJECTION INTRAMUSCULAR; INTRAVENOUS; SUBCUTANEOUS at 02:46

## 2020-12-19 RX ADMIN — CELECOXIB 200 MILLIGRAM(S): 200 CAPSULE ORAL at 05:33

## 2020-12-19 RX ADMIN — Medication 1 MILLIGRAM(S): at 12:03

## 2020-12-19 RX ADMIN — OXYCODONE HYDROCHLORIDE 10 MILLIGRAM(S): 5 TABLET ORAL at 15:30

## 2020-12-19 RX ADMIN — HYDROMORPHONE HYDROCHLORIDE 0.5 MILLIGRAM(S): 2 INJECTION INTRAMUSCULAR; INTRAVENOUS; SUBCUTANEOUS at 03:00

## 2020-12-19 RX ADMIN — Medication 81 MILLIGRAM(S): at 05:33

## 2020-12-19 NOTE — DISCHARGE NOTE PROVIDER - NSDCFUADDINST_GEN_ALL_CORE_FT
1. Diet: Resume previous diet.  2. Activity: WBAT with assistive devices as needed.  3. Call with: fever over 101 F, wound redness, drainage or open area, calf pain/calf swelling.  4. Wound Care: DO NOT remove Aquacel bandage. NO PEEKING! Bandage will be removed on post-operative day 10.  5. Ok to Shower with Aquacel. Avoid direct water beating on bandage. Do not submerge.   6. DVT/PE Prophylaxis: Ecotrin Aspirin 81mg twice a day for 30 days. DO NOT skip doses.   7. Continue Protonix daily while on anticoagulant medication. An electronic prescription has been sent to your pharmacy.  8. Follow up with Dr. James in 2 weeks. Please call the office to schedule appointment.   9. Pain medication: An electronic prescription has been sent to your pharmacy for  if you go home. Please take medications as prescribed.

## 2020-12-19 NOTE — PROGRESS NOTE ADULT - ATTENDING COMMENTS
I agree with the above note and have personally seen and examined this patient. All pertinent films have been reviewed. Please refer to clinical documentation of the history, physical examinations, data summary, and both assessment and plan as documented above and with which I agree.    Amaury James MD  Attending Orthopedic Surgeon

## 2020-12-19 NOTE — PROGRESS NOTE ADULT - SUBJECTIVE AND OBJECTIVE BOX
Orthopedics Post-op Check  POD 0  Patient seen and examined at bedside. Pain is controlled. Pt feeling well. No cp, sob, nausea or vomiting.    Vital Signs Last 24 Hrs  T(C): 36.9 (12-18-20 @ 22:19), Max: 36.9 (12-18-20 @ 22:19)  T(F): 98.4 (12-18-20 @ 22:19), Max: 98.4 (12-18-20 @ 22:19)  HR: 64 (12-18-20 @ 22:19) (64 - 99)  BP: 119/59 (12-18-20 @ 22:19) (114/59 - 152/72)  BP(mean): 80 (12-18-20 @ 14:10) (76 - 82)  RR: 16 (12-18-20 @ 22:19) (15 - 20)  SpO2: 94% (12-18-20 @ 22:19) (94% - 100%)              Exam:  Gen: NAD, resting comfortably  Dressing c/d/i  +EHL/FHL/TA/GS  SILT L2-S1  +DP/PT 2+  Calf NTTP b/l  Compartments soft and compressible    A/P:  70yFemale Stable POD 0  s/p L TKA    -FU labs  -WBAT  -Pain control PRN  -PT/OT  -Ppx ABX  -DVT PE ppx: A81  -Incentive spirometry  - Dispo planning per PT: Home  - Will discuss with Dr. James and advise if any changes to plan
  Patient seen and examined at bedside. Pain is controlled. Pt feeling well. No cp, sob, nausea or vomiting.    Vital Signs Last 24 Hrs  T(C): 36.7 (19 Dec 2020 05:23), Max: 36.9 (18 Dec 2020 22:19)  T(F): 98.1 (19 Dec 2020 05:23), Max: 98.4 (18 Dec 2020 22:19)  HR: 62 (19 Dec 2020 05:23) (62 - 99)  BP: 101/61 (19 Dec 2020 05:23) (101/61 - 152/72)  BP(mean): 80 (18 Dec 2020 14:10) (76 - 82)  RR: 18 (19 Dec 2020 05:23) (15 - 20)  SpO2: 95% (19 Dec 2020 05:23) (94% - 100%)      Exam:  Gen: NAD, resting comfortably  Dressing c/d/i  +EHL/FHL/TA/GS  SILT L2-S1  +DP/PT 2+  Calf NTTP b/l  Compartments soft and compressible    A/P:  70yFemale Stable POD 1  s/p L TKA    -FU AM labs  -WBAT  -Pain control PRN  -PT/OT  -Ppx ABX  -DVT PE ppx: A81  -Incentive spirometry  - Dispo planning per PT: Home  - Will discuss with Dr. James and advise if any changes to plan

## 2020-12-19 NOTE — DISCHARGE NOTE PROVIDER - NSDCMRMEDTOKEN_GEN_ALL_CORE_FT
acetaminophen 325 mg oral tablet: 3 tab(s) orally every 8 hours MDD:3  aspirin 81 mg oral delayed release tablet: 1 tab(s) orally 2 times a day MDD:2  CeleBREX 200 mg oral capsule: 1 cap(s) orally 2 times a day MDD:2  Lyrica 75 mg oral capsule: 1 cap(s) orally 2 times a day MDD:2  omeprazole 40 mg oral delayed release capsule: 1 cap(s) orally once a day MDD:1  oxyCODONE 5 mg oral tablet: 1 tab(s) orally every 6 hours MDD:4  traMADol 50 mg oral tablet: 1 tab(s) orally every 6 hours MDD:4

## 2020-12-19 NOTE — DISCHARGE NOTE NURSING/CASE MANAGEMENT/SOCIAL WORK - PATIENT PORTAL LINK FT
You can access the FollowMyHealth Patient Portal offered by [facility] by registering at the following website: [email or web address]. By joining [employer]’s FollowMyHealth portal, you will also be able to view your health information using other applications (apps) compatible with our system.

## 2020-12-19 NOTE — DISCHARGE NOTE PROVIDER - CARE PROVIDER_API CALL
Amaury James)  Orthopedics  [address], [address]  [place] [address]  Phone: [phone]  Fax: [phone]  Follow Up Time:

## 2020-12-19 NOTE — DISCHARGE NOTE PROVIDER - HOSPITAL COURSE
The patient is a 70 year old female status post elective total knee Arthroplasty to the LEFT knee after failing outpatient nonoperative conservative management. Patient presented to [facility] after being medically cleared for an elective surgical procedure. The patient was taken to the operating room on date mentioned above. Prophylactic antibiotics were started before the procedure and continued for 24 hours. There were no complications during the procedure and patient tolerated the procedure well. The patient was transferred to the recovery room in stable condition and subsequently to the surgical floor. The patient was placed on Aspirin 81 for anticoagulation. All home medications were continued. The patient received physical therapy daily and daily labs were followed. The dressing was kept clean, dry, intact. The rest of the hospital stay was unremarkable.

## 2020-12-19 NOTE — PROGRESS NOTE ADULT - ATTENDING COMMENTS
I agree with the above note on this patient. All pertinent films have been reviewed. Please refer to clinical documentation of the history, physical examinations, data summary, and both assessment and plan as documented above and with which I agree.    Amaury James MD  Attending Orthopedic Surgeon

## 2020-12-22 DIAGNOSIS — M17.12 UNILATERAL PRIMARY OSTEOARTHRITIS, LEFT KNEE: ICD-10-CM

## 2020-12-29 RX ORDER — OXYCODONE 5 MG/1
5 TABLET ORAL
Qty: 40 | Refills: 0 | Status: ACTIVE | COMMUNITY
Start: 2020-12-29 | End: 1900-01-01

## 2021-01-03 DIAGNOSIS — Z00.00 ENCOUNTER FOR GENERAL ADULT MEDICAL EXAMINATION W/OUT ABNORMAL FINDINGS: ICD-10-CM

## 2021-01-04 ENCOUNTER — APPOINTMENT (OUTPATIENT)
Dept: ORTHOPEDIC SURGERY | Facility: CLINIC | Age: 71
End: 2021-01-04
Payer: COMMERCIAL

## 2021-01-04 PROCEDURE — 73562 X-RAY EXAM OF KNEE 3: CPT | Mod: LT

## 2021-01-04 PROCEDURE — 99024 POSTOP FOLLOW-UP VISIT: CPT

## 2021-01-05 NOTE — DISCUSSION/SUMMARY
[de-identified] : The patient is doing well after joint replacement surgery. Written infectious precautions were reviewed. The patient will progress with physical therapy at this time and they will work on transitioning from requiring assistive devices for ambulation. Aspirin therapy will be discontinued at 1 month post surgery for the purpose of orthopedic thromboembolism prophylaxis. Return around the 6 week anniversary from surgery for follow-up evaluation. \par \par The patient returns to the office today for staple removal from the right knee wound. The staples have been in since surgery. The patient has no complaints. The wound is healing well and is without evidence of infection or wound dehiscence. The wound was prepped with betadine and a staple removal tool was used to remove all staples in their entirety. Steri strips were placed over the wound and the patient was instructed to leave these in place until they fall off on their own. The patient tolerated the procedure well and there were no immediate complications. The wound edges are well adhered. The patient was instructed to continue to keep it clean.

## 2021-01-05 NOTE — HISTORY OF PRESENT ILLNESS
[de-identified] : Status-post left total knee arthroplasty here for initial postoperative evaluation. Excellent progress is noted in terms of pain and restoration of function. Pain is well controlled with oral medications. There has been no change in medical health since discharge. The patient does require assistive devices.

## 2021-01-05 NOTE — PHYSICAL EXAM
[de-identified] : Well developed, well nourished in no apparent distress, awake, alert and orientated to person, place and time with appropriate mood and affect\par Respirations are even and unlabored. Gait evaluation does not reveal a limp. There is no inguinal adenopathy. The affected limb is well-perfused with palpable pedal pulse, without skin lesions, shows a grossly normal motor and sensory examination. Incision is healing well. Knee motion is 5-95 [de-identified] : AP, lateral, sunrise knee x-rays of the left knee were ordered and obtained in the office and demonstrate satisfactory position and alignment of the components are present. No signs of loosening are seen.

## 2021-02-05 RX ORDER — TRAMADOL HYDROCHLORIDE 50 MG/1
50 TABLET, COATED ORAL
Qty: 40 | Refills: 0 | Status: ACTIVE | COMMUNITY
Start: 2021-02-05 | End: 1900-01-01

## 2021-02-08 ENCOUNTER — APPOINTMENT (OUTPATIENT)
Dept: ORTHOPEDIC SURGERY | Facility: CLINIC | Age: 71
End: 2021-02-08
Payer: COMMERCIAL

## 2021-02-08 VITALS — HEIGHT: 63 IN | BODY MASS INDEX: 44.47 KG/M2 | WEIGHT: 251 LBS

## 2021-02-08 PROCEDURE — 99024 POSTOP FOLLOW-UP VISIT: CPT

## 2021-02-08 RX ORDER — PREGABALIN 75 MG/1
75 CAPSULE ORAL
Qty: 20 | Refills: 0 | Status: ACTIVE | COMMUNITY
Start: 2020-12-19

## 2021-02-08 RX ORDER — BUDESONIDE 0.5 MG/2ML
0.5 INHALANT ORAL
Qty: 60 | Refills: 0 | Status: ACTIVE | COMMUNITY
Start: 2021-02-03

## 2021-02-08 RX ORDER — IPRATROPIUM BROMIDE AND ALBUTEROL SULFATE 2.5; .5 MG/3ML; MG/3ML
0.5-2.5 (3) SOLUTION RESPIRATORY (INHALATION)
Qty: 360 | Refills: 0 | Status: ACTIVE | COMMUNITY
Start: 2021-02-03

## 2021-02-08 RX ORDER — DEXAMETHASONE 4 MG/1
4 TABLET ORAL
Qty: 10 | Refills: 0 | Status: ACTIVE | COMMUNITY
Start: 2021-02-03

## 2021-02-08 RX ORDER — OMEPRAZOLE 40 MG/1
40 CAPSULE, DELAYED RELEASE ORAL
Qty: 30 | Refills: 0 | Status: ACTIVE | COMMUNITY
Start: 2020-12-19

## 2021-02-08 RX ORDER — ASPIRIN 81 MG/1
81 TABLET, COATED ORAL
Qty: 60 | Refills: 0 | Status: ACTIVE | COMMUNITY
Start: 2020-12-19

## 2021-02-08 RX ORDER — ACETAMINOPHEN 325 MG/1
325 TABLET ORAL
Qty: 270 | Refills: 0 | Status: ACTIVE | COMMUNITY
Start: 2020-12-19

## 2021-02-08 NOTE — PHYSICAL EXAM
[de-identified] : Well developed, well nourished in no apparent distress, awake, alert and orientated to person, place and time with appropriate mood and affect\par Respirations are even and unlabored. Gait evaluation does not reveal a limp. There is no inguinal adenopathy. The affected limb is well-perfused with palpable pedal pulse, without skin lesions, shows a grossly normal motor and sensory examination. Incision is healing well. Knee motion is 0-105.

## 2021-02-08 NOTE — DISCUSSION/SUMMARY
[de-identified] : The patient is doing well after joint replacement surgery.  Continue to be weight-bear as tolerated.  Follow-up at the recommended 6-month anniversary from surgery.

## 2021-02-08 NOTE — HISTORY OF PRESENT ILLNESS
[de-identified] : Status-post left total knee arthroplasty here for routine postoperative evaluation. Excellent progress is noted in terms of pain and restoration of function. Pain is well controlled with oral medications. There has been no change in medical health since discharge. The patient does require assistive devices.

## 2021-03-01 ENCOUNTER — APPOINTMENT (OUTPATIENT)
Dept: ORTHOPEDIC SURGERY | Facility: CLINIC | Age: 71
End: 2021-03-01

## 2021-03-03 NOTE — OCCUPATIONAL THERAPY INITIAL EVALUATION ADULT - GENERAL OBSERVATIONS, REHAB EVAL
Chart reviewed. Patient seen seated in chair in PT/OT preoperative assessment room with no apparent distress. Patient underwent pre-operative consultation to determine current functional mobility limitations to determine appropriate need for assistive devices. Referring Physician (Optional): Brendon

## 2021-03-18 ENCOUNTER — FORM ENCOUNTER (OUTPATIENT)
Age: 71
End: 2021-03-18

## 2021-04-12 ENCOUNTER — APPOINTMENT (OUTPATIENT)
Dept: ORTHOPEDIC SURGERY | Facility: CLINIC | Age: 71
End: 2021-04-12
Payer: MEDICARE

## 2021-04-12 VITALS — BODY MASS INDEX: 43.94 KG/M2 | HEIGHT: 63 IN | WEIGHT: 248 LBS

## 2021-04-12 PROCEDURE — 99072 ADDL SUPL MATRL&STAF TM PHE: CPT

## 2021-04-12 PROCEDURE — 99213 OFFICE O/P EST LOW 20 MIN: CPT

## 2021-04-12 NOTE — DISCUSSION/SUMMARY
[de-identified] : The patient is doing satisfactory after left total knee replacement surgery.  Unfortunately she did not do any physical therapy and so she developed some arthrofibrosis.  She did not tell me that she lost her insurance and has not been doing physical therapy.  This is the first time she has told me this.  She will re-start PT on 4/21. I explained to her the importance of working on her flexion because otherwise this can become permanent. Continue to be weight bearing as tolerated without restriction. Follow up is recommended in 6 weeks to check her ROM progress.

## 2021-04-12 NOTE — HISTORY OF PRESENT ILLNESS
[de-identified] : This is very nice 70 year female  here for interim evaluation of left total knee arthroplasty.(12/18/20). She lost her insurance and did not do any formal PT for 2 months from Feb. to now. She now has insurance and is starting PT again on the 4/21.  The patient reports good pain relief since surgery in the replaced joint and satisfactory restoration of function in terms of activities of daily living. The patient uses a cane for ambulation and will use Tramadol when she re-starts PT.  The patient is happy with their progress from surgery but has developed some stiffness in the knee.

## 2021-04-12 NOTE — PHYSICAL EXAM
[de-identified] : Well developed, well nourished in no apparent distress, awake, alert and orientated to person, place and time with appropriate mood and affect\par Respirations are even and unlabored. Gait evaluation does not reveal a limp. There is no inguinal adenopathy. The affected limb is well-perfused with palpable pedal pulse, without skin lesions, shows a grossly normal motor and sensory examination. Incision is healed. Knee motion is 0-95

## 2021-04-14 RX ORDER — TRAMADOL HYDROCHLORIDE 50 MG/1
50 TABLET, COATED ORAL
Qty: 40 | Refills: 0 | Status: ACTIVE | COMMUNITY
Start: 2021-01-04 | End: 1900-01-01

## 2021-05-03 ENCOUNTER — APPOINTMENT (OUTPATIENT)
Dept: ORTHOPEDIC SURGERY | Facility: CLINIC | Age: 71
End: 2021-05-03
Payer: MEDICARE

## 2021-05-03 VITALS — WEIGHT: 248 LBS | HEIGHT: 63 IN | BODY MASS INDEX: 43.94 KG/M2

## 2021-05-03 DIAGNOSIS — M25.562 PAIN IN RIGHT KNEE: ICD-10-CM

## 2021-05-03 DIAGNOSIS — M17.0 BILATERAL PRIMARY OSTEOARTHRITIS OF KNEE: ICD-10-CM

## 2021-05-03 DIAGNOSIS — M25.561 PAIN IN RIGHT KNEE: ICD-10-CM

## 2021-05-03 PROCEDURE — 99213 OFFICE O/P EST LOW 20 MIN: CPT | Mod: 25

## 2021-05-03 PROCEDURE — 99072 ADDL SUPL MATRL&STAF TM PHE: CPT

## 2021-05-03 PROCEDURE — 20610 DRAIN/INJ JOINT/BURSA W/O US: CPT | Mod: RT

## 2021-05-03 NOTE — PHYSICAL EXAM
[de-identified] : Patient is well nourished, well-developed, in no acute distress, with appropriate mood and affect. The patient is oriented to time, place, and person. Respirations are even and unlabored. Gait evaluation does reveal a limp. There is no inguinal adenopathy. Bilateral limbs are well-perfused, without skin lesions, shows a grossly normal motor and sensory examination. The right knee motion is significantly reduced and does cause significant pain. The right knee moves from 0-105 degrees. The knee is stable within that range-of-motion to AP and ML stress. The alignment of the knee is [5 degrees varus]. Muscle strength is normal. Pedal pulses are palpable. Hip examination was negative. The left knee motion is significantly reduced and does cause significant pain. The left knee moves from 0-105 degrees. The knee is stable within that range-of-motion to AP and ML stress. The alignment of the knee is neutral. Well-healed midline surgical scar. Muscle strength is normal. Pedal pulses are palpable. Hip examination was negative.

## 2021-05-03 NOTE — DISCUSSION/SUMMARY
[de-identified] : The patient is doing satisfactory after left total knee replacement surgery.  Unfortunately she did not do any physical therapy and so she developed some arthrofibrosis.  She did not tell me that she lost her insurance and has not been doing physical therapy.  This is the first time she has told me this. Has since restarted PT. Continue with PT.\par \par Also has right knee osteoarthritis. Today we performed a right knee intra-articular cortisone injection.\par \par Informed consent for the right knee injection was obtained. All questions were answered. A time out was performed. The right knee was prepped and draped in sterile fashion. Using sterile technique, the right knee was injected with 2cc of Kenalog, 4cc of 1% lidocaine, 4cc of 0.25% marcaine using a 21-gauge needle. A sterile dressing was applied. Post injection instructions were reviewed. The patient tolerated the procedure well.\par

## 2021-05-03 NOTE — HISTORY OF PRESENT ILLNESS
[de-identified] : This is very nice 70 year female  here for interim evaluation of left total knee arthroplasty.(12/18/20). She was noncompliant postoperatively and did not do any physical therapy. She then developed some arthrofibrosis but had been doing some therapy since the last time I saw her. She is working on this. No pain in the joint. Pleased with her progress. \par \par Also has known right knee osteoarthritis. We have been treating this conservatively in the past and she would like to continue with conservative treatment for the right knee as well.

## 2021-06-27 NOTE — DISCHARGE NOTE NURSING/CASE MANAGEMENT/SOCIAL WORK - NSFLUVACAGEDISCH_IMM_ALL_CORE
Adult Patient reports being on Lantus 12 units at bedtime.  Will reduce to 9 units while inpatient.  Patient placed on insulin sliding scale.  Monitor fingersticks.  Consistent carbohydrate diet ordered. WBC 12.47 on admission. Pt afebrile.   - F/u UA (pt with no urinary symptoms but has history of poorly controlled DM)  - Monitor WBC

## 2021-07-26 ENCOUNTER — NON-APPOINTMENT (OUTPATIENT)
Age: 71
End: 2021-07-26

## 2021-11-08 ENCOUNTER — APPOINTMENT (OUTPATIENT)
Dept: ORTHOPEDIC SURGERY | Facility: CLINIC | Age: 71
End: 2021-11-08
Payer: MEDICARE

## 2021-11-08 VITALS — BODY MASS INDEX: 45.54 KG/M2 | HEIGHT: 63 IN | WEIGHT: 257 LBS

## 2021-11-08 DIAGNOSIS — M17.12 UNILATERAL PRIMARY OSTEOARTHRITIS, LEFT KNEE: ICD-10-CM

## 2021-11-08 DIAGNOSIS — Z96.652 PRESENCE OF LEFT ARTIFICIAL KNEE JOINT: ICD-10-CM

## 2021-11-08 PROCEDURE — 20610 DRAIN/INJ JOINT/BURSA W/O US: CPT | Mod: RT

## 2021-11-08 PROCEDURE — 73562 X-RAY EXAM OF KNEE 3: CPT | Mod: LT

## 2021-11-08 PROCEDURE — 73560 X-RAY EXAM OF KNEE 1 OR 2: CPT | Mod: RT

## 2021-11-08 PROCEDURE — 99214 OFFICE O/P EST MOD 30 MIN: CPT | Mod: 25

## 2021-11-08 RX ORDER — CELECOXIB 100 MG/1
100 CAPSULE ORAL
Qty: 60 | Refills: 2 | Status: ACTIVE | COMMUNITY
Start: 2021-11-08 | End: 1900-01-01

## 2021-11-08 NOTE — DISCUSSION/SUMMARY
[de-identified] : This patient has well-functioning left total knee arthroplasty as well as right knee osteoarthritis.  The patient is not an appropriate candidate for surgical intervention at this time. An extensive discussion was conducted on the natural history of the disease and the variety of surgical and non-surgical options available to the patient including, but not limited to non-steroidal anti-inflammatory medications, steroid injections, physical therapy, maintenance of ideal body weight, and reduction of activity.  Today we performed a right knee intra-articular cortisone injection.  Physical therapy prescribed.  Celebrex prescribed. The patient will schedule an appointment as needed.\par \par Informed consent for the right knee injection was obtained. All questions were answered. A time out was performed. The right knee was prepped and draped in sterile fashion. Using sterile technique, the right knee was injected with 2cc of Kenalog, 4cc of 1% lidocaine, 4cc of 0.25% marcaine using a 21-gauge needle. A sterile dressing was applied. Post injection instructions were reviewed. The patient tolerated the procedure well.\par \par

## 2021-11-08 NOTE — HISTORY OF PRESENT ILLNESS
[de-identified] : This is a very pleasant 71-year-old female who is here 1 year status post left total knee arthroplasty and also for second complaint of right knee osteoarthritis.  She is done extremely well from the left total knee arthroplasty.  Thrilled with her outcome from this.  The right knee does limit her.  It limits her walking.  Activities of daily living are limited.  Taking Celebrex for the pain.  Has tried injections in the past which do help.  The patient denies any radiation of the pain to the feet and it is not associated with numbness, tingling, or weakness.

## 2021-11-08 NOTE — PHYSICAL EXAM
[de-identified] : Patient is well nourished, well-developed, in no acute distress, with appropriate mood and affect. The patient is oriented to time, place, and person. Respirations are even and unlabored. Gait evaluation does reveal a limp. There is no inguinal adenopathy. Bilateral limbs are well-perfused, without skin lesions, shows a grossly normal motor and sensory examination. The right knee motion is significantly reduced and does cause significant pain. The right knee moves from 0-110 degrees. The knee is stable within that range-of-motion to AP and ML stress. The alignment of the knee is [5 degrees varus]. Muscle strength is normal. Pedal pulses are palpable. Hip examination was negative. The left knee motion is painless and the left knee moves from 0-100 degrees. The knee is stable within that range-of-motion to AP and ML stress. The alignment of the knee is neutral.  Well-healed midline surgical scar.  Muscle strength is normal. Pedal pulses are palpable. Hip examination was negative. [de-identified] : AP, lateral, sunrise knee x-rays of the left knee were ordered and obtained in the office and demonstrate satisfactory position and alignment of the components are present. No signs of loosening are seen.  AP knee radiograph of the right knee was ordered and obtained the office demonstrates right knee osteoarthritis.

## 2022-03-14 ENCOUNTER — APPOINTMENT (OUTPATIENT)
Dept: ORTHOPEDIC SURGERY | Facility: CLINIC | Age: 72
End: 2022-03-14

## 2022-04-11 ENCOUNTER — APPOINTMENT (OUTPATIENT)
Dept: ORTHOPEDIC SURGERY | Facility: CLINIC | Age: 72
End: 2022-04-11
Payer: COMMERCIAL

## 2022-04-11 VITALS — HEIGHT: 63 IN | WEIGHT: 268 LBS | BODY MASS INDEX: 47.48 KG/M2

## 2022-04-11 PROCEDURE — 20610 DRAIN/INJ JOINT/BURSA W/O US: CPT | Mod: RT

## 2022-04-11 PROCEDURE — 99214 OFFICE O/P EST MOD 30 MIN: CPT | Mod: 25

## 2022-04-11 PROCEDURE — 73564 X-RAY EXAM KNEE 4 OR MORE: CPT | Mod: RT

## 2022-04-11 NOTE — HISTORY OF PRESENT ILLNESS
[de-identified] : This is a very pleasant 71-year-old female who complains of right knee osteoarthritis.  The pain is severe again.  She is done extremely well from the left total knee arthroplasty.  Thrilled with her outcome from this.  The right knee does limit her.  It limits her walking.  Activities of daily living are limited.  Taking Celebrex for the pain.  Has tried injections in the past which do help.  The patient denies any radiation of the pain to the feet and it is not associated with numbness, tingling, or weakness.

## 2022-04-11 NOTE — PHYSICAL EXAM
[de-identified] : Patient is well nourished, well-developed, in no acute distress, with appropriate mood and affect. The patient is oriented to time, place, and person. Respirations are even and unlabored. Gait evaluation does reveal a limp. There is no inguinal adenopathy. Bilateral limbs are well-perfused, without skin lesions, shows a grossly normal motor and sensory examination. The right knee motion is significantly reduced and does cause significant pain. The right knee moves from 0-110 degrees. The knee is stable within that range-of-motion to AP and ML stress. The alignment of the knee is 5 degrees varus. Muscle strength is normal. Pedal pulses are palpable. Hip examination was negative. The left knee motion is painless and the left knee moves from 0-100 degrees. The knee is stable within that range-of-motion to AP and ML stress. The alignment of the knee is neutral.  Well-healed midline surgical scar.  Muscle strength is normal. Pedal pulses are palpable. Hip examination was negative. [de-identified] : Long standing knee, AP knee, lateral knee, and patellar views of the right knee were ordered and taken in the office and demonstrate degenerative joint disease of the knee with joint space narrowing, osteophyte formation, and subchondral sclerosis.

## 2022-04-11 NOTE — DISCUSSION/SUMMARY
[de-identified] : This patient has right knee osteoarthritis.  The patient is not an appropriate candidate for surgical intervention at this time. An extensive discussion was conducted on the natural history of the disease and the variety of surgical and non-surgical options available to the patient including, but not limited to non-steroidal anti-inflammatory medications, steroid injections, physical therapy, maintenance of ideal body weight, and reduction of activity.  Today we performed a right knee intra-articular cortisone injection.  The patient will schedule an appointment as needed.\par \par Informed consent for the right knee injection was obtained. All questions were answered. A time out was performed. The right knee was prepped and draped in sterile fashion. Using sterile technique, the right knee was injected with 80mg of Kenalog, 4cc of 1% lidocaine, 4cc of 0.25% marcaine using a 21-gauge needle. A sterile dressing was applied. Post injection instructions were reviewed. The patient tolerated the procedure well.\par \par \par

## 2022-08-24 ENCOUNTER — NON-APPOINTMENT (OUTPATIENT)
Age: 72
End: 2022-08-24

## 2022-10-03 ENCOUNTER — APPOINTMENT (OUTPATIENT)
Dept: ORTHOPEDIC SURGERY | Facility: CLINIC | Age: 72
End: 2022-10-03

## 2023-03-06 ENCOUNTER — APPOINTMENT (OUTPATIENT)
Dept: ORTHOPEDIC SURGERY | Facility: CLINIC | Age: 73
End: 2023-03-06
Payer: COMMERCIAL

## 2023-03-06 VITALS — BODY MASS INDEX: 46.78 KG/M2 | HEIGHT: 64 IN | WEIGHT: 274 LBS

## 2023-03-06 DIAGNOSIS — M17.11 UNILATERAL PRIMARY OSTEOARTHRITIS, RIGHT KNEE: ICD-10-CM

## 2023-03-06 PROCEDURE — 99214 OFFICE O/P EST MOD 30 MIN: CPT | Mod: 25

## 2023-03-06 PROCEDURE — 20610 DRAIN/INJ JOINT/BURSA W/O US: CPT | Mod: RT

## 2023-03-06 PROCEDURE — 73564 X-RAY EXAM KNEE 4 OR MORE: CPT | Mod: RT

## 2023-03-06 RX ORDER — MELOXICAM 15 MG/1
15 TABLET ORAL
Qty: 30 | Refills: 2 | Status: ACTIVE | COMMUNITY
Start: 2023-03-06 | End: 1900-01-01

## 2023-03-06 NOTE — HISTORY OF PRESENT ILLNESS
[de-identified] : This is very nice 72-year-old female experiencing right knee pain, which is severe in intensity.  Worse after a fall at work.  The pain substantially limits activities of daily living. Walking tolerance is reduced. The patient denies any radiation of the pain to the feet and it is not associated with numbness, tingling, or weakness.

## 2023-03-06 NOTE — DISCUSSION/SUMMARY
[de-identified] : This patient has right knee osteoarthritis.  The patient is not an appropriate candidate for surgical intervention at this time. An extensive discussion was conducted on the natural history of the disease and the variety of surgical and non-surgical options available to the patient including, but not limited to non-steroidal anti-inflammatory medications, steroid injections, physical therapy, maintenance of ideal body weight, and reduction of activity.  I recommended and prescribed a course of Mobic and physical therapy.  The patient is also encouraged to trial a neoprene sleeve knee brace which can be purchased OTC.  The patient is also encouraged to consider use of a cane.  Today we performed a right knee intra-articular cortisone injection. The patient will schedule an appointment as needed.\par \par Informed consent for the right knee injection was obtained. All questions were answered. A time out was performed. The right knee was prepped and draped in sterile fashion. Using sterile technique, the right knee was injected with 80mg of Kenalog, 4cc of 1% lidocaine, 4cc of 0.25% marcaine using a 21-gauge needle. A sterile dressing was applied. Post injection instructions were reviewed. The patient tolerated the procedure well.\par

## 2023-03-06 NOTE — PHYSICAL EXAM
[de-identified] : Patient is well nourished, well-developed, in no acute distress, with appropriate mood and affect. The patient is oriented to time, place, and person. Respirations are even and unlabored. Gait evaluation does reveal a limp. There is no inguinal adenopathy. Examination of the contralateral knee shows normal range of motion, strength, no tenderness, and intact skin. The affected limb is well-perfused, without skin lesions, shows a grossly normal motor and sensory examination. Knee motion is significantly reduced and does cause significant pain. The knee moves from 0-90 degrees. The knee is stable within that range-of-motion to AP and ML stress. The alignment of the knee is 5 degrees varus. Muscle strength is normal. Pedal pulses are palpable. Hip examination was negative.\par  [de-identified] : Long standing knee, AP knee, lateral knee, and patellar views of the right knee were ordered and taken in the office and demonstrate degenerative joint disease of the knee with joint space narrowing, osteophyte formation, and subchondral sclerosis.

## 2024-07-19 ENCOUNTER — APPOINTMENT (OUTPATIENT)
Dept: ORTHOPEDIC SURGERY | Facility: CLINIC | Age: 74
End: 2024-07-19

## 2025-06-02 NOTE — PHYSICAL THERAPY INITIAL EVALUATION ADULT - PLANNED THERAPY INTERVENTIONS, PT EVAL
Detail Level: Generalized Detail Level: Simple Detail Level: Detailed Detail Level: Zone Pt will be able to negotiate 6 steps using Right rail up, left sided cane independently in 2 to 3 weeks/balance training/bed mobility training/gait training/strengthening/transfer training